# Patient Record
Sex: FEMALE | Race: WHITE | NOT HISPANIC OR LATINO | Employment: FULL TIME | URBAN - METROPOLITAN AREA
[De-identification: names, ages, dates, MRNs, and addresses within clinical notes are randomized per-mention and may not be internally consistent; named-entity substitution may affect disease eponyms.]

---

## 2018-10-16 ENCOUNTER — APPOINTMENT (OUTPATIENT)
Dept: ADMISSIONS | Facility: MEDICAL CENTER | Age: 33
End: 2018-10-16
Attending: SPECIALIST
Payer: COMMERCIAL

## 2018-10-18 ENCOUNTER — HOSPITAL ENCOUNTER (OUTPATIENT)
Facility: MEDICAL CENTER | Age: 33
End: 2018-10-18
Attending: SPECIALIST | Admitting: SPECIALIST
Payer: COMMERCIAL

## 2018-10-18 VITALS
HEART RATE: 76 BPM | BODY MASS INDEX: 34.27 KG/M2 | DIASTOLIC BLOOD PRESSURE: 73 MMHG | WEIGHT: 205.69 LBS | RESPIRATION RATE: 16 BRPM | TEMPERATURE: 97.6 F | OXYGEN SATURATION: 92 % | SYSTOLIC BLOOD PRESSURE: 114 MMHG | HEIGHT: 65 IN

## 2018-10-18 DIAGNOSIS — H90.A31 MIXED CONDUCTIVE AND SENSORINEURAL HEARING LOSS OF RIGHT EAR WITH RESTRICTED HEARING OF LEFT EAR: ICD-10-CM

## 2018-10-18 DIAGNOSIS — H81.311 AUDITORY VERTIGO INVOLVING RIGHT EAR: ICD-10-CM

## 2018-10-18 DIAGNOSIS — H80.11: ICD-10-CM

## 2018-10-18 PROBLEM — H90.71 MIXED HEARING LOSS OF RIGHT EAR: Status: ACTIVE | Noted: 2018-10-18

## 2018-10-18 LAB
B-HCG FREE SERPL-ACNC: <5 MIU/ML
IHCGL IHCGL: NEGATIVE MIU/ML
PATHOLOGY CONSULT NOTE: NORMAL

## 2018-10-18 PROCEDURE — 502573 HCHG PACK, ENT: Performed by: SPECIALIST

## 2018-10-18 PROCEDURE — 88311 DECALCIFY TISSUE: CPT

## 2018-10-18 PROCEDURE — 700111 HCHG RX REV CODE 636 W/ 250 OVERRIDE (IP)

## 2018-10-18 PROCEDURE — 160009 HCHG ANES TIME/MIN: Performed by: SPECIALIST

## 2018-10-18 PROCEDURE — 501838 HCHG SUTURE GENERAL: Performed by: SPECIALIST

## 2018-10-18 PROCEDURE — 700101 HCHG RX REV CODE 250

## 2018-10-18 PROCEDURE — 160002 HCHG RECOVERY MINUTES (STAT): Performed by: SPECIALIST

## 2018-10-18 PROCEDURE — 700111 HCHG RX REV CODE 636 W/ 250 OVERRIDE (IP): Performed by: ANESTHESIOLOGY

## 2018-10-18 PROCEDURE — 88304 TISSUE EXAM BY PATHOLOGIST: CPT

## 2018-10-18 PROCEDURE — 502000 HCHG MISC OR IMPLANTS RC 0278: Performed by: SPECIALIST

## 2018-10-18 PROCEDURE — 160025 RECOVERY II MINUTES (STATS): Performed by: SPECIALIST

## 2018-10-18 PROCEDURE — 160035 HCHG PACU - 1ST 60 MINS PHASE I: Performed by: SPECIALIST

## 2018-10-18 PROCEDURE — 160048 HCHG OR STATISTICAL LEVEL 1-5: Performed by: SPECIALIST

## 2018-10-18 PROCEDURE — 160029 HCHG SURGERY MINUTES - 1ST 30 MINS LEVEL 4: Performed by: SPECIALIST

## 2018-10-18 PROCEDURE — 700102 HCHG RX REV CODE 250 W/ 637 OVERRIDE(OP)

## 2018-10-18 PROCEDURE — 502240 HCHG MISC OR SUPPLY RC 0272: Performed by: SPECIALIST

## 2018-10-18 PROCEDURE — 160047 HCHG PACU  - EA ADDL 30 MINS PHASE II: Performed by: SPECIALIST

## 2018-10-18 PROCEDURE — 160041 HCHG SURGERY MINUTES - EA ADDL 1 MIN LEVEL 4: Performed by: SPECIALIST

## 2018-10-18 PROCEDURE — 84702 CHORIONIC GONADOTROPIN TEST: CPT

## 2018-10-18 PROCEDURE — 160046 HCHG PACU - 1ST 60 MINS PHASE II: Performed by: SPECIALIST

## 2018-10-18 PROCEDURE — 500380 HCHG DRAIN, PENROSE 1/4X12: Performed by: SPECIALIST

## 2018-10-18 PROCEDURE — A9270 NON-COVERED ITEM OR SERVICE: HCPCS

## 2018-10-18 DEVICE — IMPLANTABLE DEVICE: Type: IMPLANTABLE DEVICE | Site: EAR | Status: FUNCTIONAL

## 2018-10-18 RX ORDER — MIDAZOLAM HYDROCHLORIDE 1 MG/ML
1 INJECTION INTRAMUSCULAR; INTRAVENOUS
Status: DISCONTINUED | OUTPATIENT
Start: 2018-10-18 | End: 2018-10-18 | Stop reason: HOSPADM

## 2018-10-18 RX ORDER — LIDOCAINE HYDROCHLORIDE 10 MG/ML
INJECTION, SOLUTION EPIDURAL; INFILTRATION; INTRACAUDAL; PERINEURAL
Status: DISCONTINUED | OUTPATIENT
Start: 2018-10-18 | End: 2018-10-18 | Stop reason: HOSPADM

## 2018-10-18 RX ORDER — ACETAMINOPHEN 325 MG/1
TABLET ORAL
Status: COMPLETED
Start: 2018-10-18 | End: 2018-10-18

## 2018-10-18 RX ORDER — SODIUM CHLORIDE, SODIUM LACTATE, POTASSIUM CHLORIDE, CALCIUM CHLORIDE 600; 310; 30; 20 MG/100ML; MG/100ML; MG/100ML; MG/100ML
INJECTION, SOLUTION INTRAVENOUS ONCE
Status: COMPLETED | OUTPATIENT
Start: 2018-10-18 | End: 2018-10-18

## 2018-10-18 RX ORDER — OXYCODONE HYDROCHLORIDE AND ACETAMINOPHEN 5; 325 MG/1; MG/1
1 TABLET ORAL
Status: DISCONTINUED | OUTPATIENT
Start: 2018-10-18 | End: 2018-10-18 | Stop reason: HOSPADM

## 2018-10-18 RX ORDER — DIPHENHYDRAMINE HYDROCHLORIDE 50 MG/ML
12.5 INJECTION INTRAMUSCULAR; INTRAVENOUS
Status: DISCONTINUED | OUTPATIENT
Start: 2018-10-18 | End: 2018-10-18 | Stop reason: HOSPADM

## 2018-10-18 RX ORDER — HALOPERIDOL 5 MG/ML
1 INJECTION INTRAMUSCULAR
Status: DISCONTINUED | OUTPATIENT
Start: 2018-10-18 | End: 2018-10-18 | Stop reason: HOSPADM

## 2018-10-18 RX ORDER — DIAZEPAM 5 MG/1
TABLET ORAL
Qty: 12 TAB | Refills: 0 | Status: SHIPPED | OUTPATIENT
Start: 2018-10-18 | End: 2018-10-20

## 2018-10-18 RX ORDER — MEPERIDINE HYDROCHLORIDE 25 MG/ML
6.25 INJECTION INTRAMUSCULAR; INTRAVENOUS; SUBCUTANEOUS
Status: DISCONTINUED | OUTPATIENT
Start: 2018-10-18 | End: 2018-10-18 | Stop reason: HOSPADM

## 2018-10-18 RX ORDER — MORPHINE SULFATE 10 MG/ML
5 INJECTION, SOLUTION INTRAMUSCULAR; INTRAVENOUS
Status: DISCONTINUED | OUTPATIENT
Start: 2018-10-18 | End: 2018-10-18 | Stop reason: HOSPADM

## 2018-10-18 RX ORDER — SODIUM CHLORIDE, SODIUM LACTATE, POTASSIUM CHLORIDE, CALCIUM CHLORIDE 600; 310; 30; 20 MG/100ML; MG/100ML; MG/100ML; MG/100ML
INJECTION, SOLUTION INTRAVENOUS CONTINUOUS
Status: DISCONTINUED | OUTPATIENT
Start: 2018-10-18 | End: 2018-10-18 | Stop reason: HOSPADM

## 2018-10-18 RX ORDER — ACETAMINOPHEN 325 MG/1
650 TABLET ORAL EVERY 4 HOURS PRN
Status: DISCONTINUED | OUTPATIENT
Start: 2018-10-18 | End: 2018-10-18 | Stop reason: HOSPADM

## 2018-10-18 RX ORDER — ONDANSETRON 2 MG/ML
4 INJECTION INTRAMUSCULAR; INTRAVENOUS
Status: DISCONTINUED | OUTPATIENT
Start: 2018-10-18 | End: 2018-10-18 | Stop reason: HOSPADM

## 2018-10-18 RX ORDER — OXYCODONE HYDROCHLORIDE AND ACETAMINOPHEN 5; 325 MG/1; MG/1
2 TABLET ORAL
Status: DISCONTINUED | OUTPATIENT
Start: 2018-10-18 | End: 2018-10-18 | Stop reason: HOSPADM

## 2018-10-18 RX ORDER — MORPHINE SULFATE 4 MG/ML
1 INJECTION, SOLUTION INTRAMUSCULAR; INTRAVENOUS
Status: DISCONTINUED | OUTPATIENT
Start: 2018-10-18 | End: 2018-10-18 | Stop reason: HOSPADM

## 2018-10-18 RX ORDER — SCOLOPAMINE TRANSDERMAL SYSTEM 1 MG/1
PATCH, EXTENDED RELEASE TRANSDERMAL
Status: DISCONTINUED
Start: 2018-10-18 | End: 2018-10-18 | Stop reason: HOSPADM

## 2018-10-18 RX ORDER — MORPHINE SULFATE 4 MG/ML
2 INJECTION, SOLUTION INTRAMUSCULAR; INTRAVENOUS
Status: DISCONTINUED | OUTPATIENT
Start: 2018-10-18 | End: 2018-10-18 | Stop reason: HOSPADM

## 2018-10-18 RX ORDER — LABETALOL HYDROCHLORIDE 5 MG/ML
5 INJECTION, SOLUTION INTRAVENOUS
Status: DISCONTINUED | OUTPATIENT
Start: 2018-10-18 | End: 2018-10-18 | Stop reason: HOSPADM

## 2018-10-18 RX ORDER — BACITRACIN ZINC 500 [USP'U]/G
OINTMENT TOPICAL
Status: DISCONTINUED | OUTPATIENT
Start: 2018-10-18 | End: 2018-10-18 | Stop reason: HOSPADM

## 2018-10-18 RX ORDER — EPINEPHRINE 1 MG/ML(1)
AMPUL (ML) INJECTION
Status: DISCONTINUED | OUTPATIENT
Start: 2018-10-18 | End: 2018-10-18 | Stop reason: HOSPADM

## 2018-10-18 RX ADMIN — ACETAMINOPHEN 650 MG: 325 TABLET, FILM COATED ORAL at 13:23

## 2018-10-18 RX ADMIN — ACETAMINOPHEN 650 MG: 325 TABLET ORAL at 13:23

## 2018-10-18 RX ADMIN — FENTANYL CITRATE 50 MCG: 50 INJECTION, SOLUTION INTRAMUSCULAR; INTRAVENOUS at 13:27

## 2018-10-18 RX ADMIN — SODIUM CHLORIDE, SODIUM LACTATE, POTASSIUM CHLORIDE, CALCIUM CHLORIDE: 600; 310; 30; 20 INJECTION, SOLUTION INTRAVENOUS at 07:31

## 2018-10-18 RX ADMIN — FENTANYL CITRATE 50 MCG: 50 INJECTION, SOLUTION INTRAMUSCULAR; INTRAVENOUS at 13:07

## 2018-10-18 ASSESSMENT — PAIN SCALES - GENERAL
PAINLEVEL_OUTOF10: 0
PAINLEVEL_OUTOF10: 5
PAINLEVEL_OUTOF10: 0
PAINLEVEL_OUTOF10: 0
PAINLEVEL_OUTOF10: 5
PAINLEVEL_OUTOF10: 0
PAINLEVEL_OUTOF10: 7
PAINLEVEL_OUTOF10: 7
PAINLEVEL_OUTOF10: 0
PAINLEVEL_OUTOF10: 5

## 2018-10-18 NOTE — DISCHARGE INSTRUCTIONS
ACTIVITY: Rest and take it easy for the first 24 hours.  A responsible adult is recommended to remain with you during that time.  It is normal to feel sleepy.  We encourage you to not do anything that requires balance, judgment or coordination.    MILD FLU-LIKE SYMPTOMS ARE NORMAL. YOU MAY EXPERIENCE GENERALIZED MUSCLE ACHES, THROAT IRRITATION, HEADACHE AND/OR SOME NAUSEA.    FOR 24 HOURS DO NOT:  Drive, operate machinery or run household appliances.  Drink beer or alcoholic beverages.   Make important decisions or sign legal documents.    SPECIAL INSTRUCTIONS: *R ear up and elevate head of bed 30 degrees**    DIET: To avoid nausea, slowly advance diet as tolerated, avoiding spicy or greasy foods for the first day.  Add more substantial food to your diet according to your physician's instructions.  Babies can be fed formula or breast milk as soon as they are hungry.  INCREASE FLUIDS AND FIBER TO AVOID CONSTIPATION.    SURGICAL DRESSING/BATHING: *Keep ear clean and dry**    FOLLOW-UP APPOINTMENT:  A follow-up appointment should be arranged with your doctor; call to schedule.    You should CALL YOUR PHYSICIAN if you develop:  Fever greater than 101 degrees F.  Pain not relieved by medication, or persistent nausea or vomiting.  Excessive bleeding (blood soaking through dressing) or unexpected drainage from the wound.  Extreme redness or swelling around the incision site, drainage of pus or foul smelling drainage.  Inability to urinate or empty your bladder within 8 hours.  Problems with breathing or chest pain.    You should call 911 if you develop problems with breathing or chest pain.  If you are unable to contact your doctor or surgical center, you should go to the nearest emergency room or urgent care center.    Physician's telephone #: *Dr. Sheldon [phone]**    If any questions arise, call your doctor.  If your doctor is not available, please feel free to call the Surgical Center at [phone].  The Center  is open Monday through Friday from 7AM to 7PM.  You can also call the HEALTH HOTLINE open 24 hours/day, 7 days/week and speak to a nurse at [phone], or toll free at [phone].    A registered nurse may call you a few days after your surgery to see how you are doing after your procedure.    MEDICATIONS: Resume taking daily medication.  Take prescribed pain medication with food.  If no medication is prescribed, you may take non-aspirin pain medication if needed.  PAIN MEDICATION CAN BE VERY CONSTIPATING.  Take a stool softener or laxative such as senokot, pericolace, or milk of magnesia if needed.    Prescription given for *none**.  Last pain medication given at *Tylenol at 1:05pm **.    If your physician has prescribed pain medication that includes Acetaminophen (Tylenol), do not take additional Acetaminophen (Tylenol) while taking the prescribed medication.    Depression / Suicide Risk    As you are discharged from this [facility] facility, it is important to learn how to keep safe from harming yourself.    Recognize the warning signs:  · Abrupt changes in personality, positive or negative- including increase in energy   · Giving away possessions  · Change in eating patterns- significant weight changes-  positive or negative  · Change in sleeping patterns- unable to sleep or sleeping all the time   · Unwillingness or inability to communicate  · Depression  · Unusual sadness, discouragement and loneliness  · Talk of wanting to die  · Neglect of personal appearance   · Rebelliousness- reckless behavior  · Withdrawal from people/activities they love  · Confusion- inability to concentrate     If you or a loved one observes any of these behaviors or has concerns about self-harm, here's what you can do:  · Talk about it- your feelings and reasons for harming yourself  · Remove any means that you might use to hurt yourself (examples: pills, rope, extension cords, firearm)  · Get professional help from the  community (Mental Health, Substance Abuse, psychological counseling)  · Do not be alone:Call your Safe Contact- someone whom you trust who will be there for you.  · Call your local CRISIS HOTLINE 184-1248 or 563-008-7067  · Call your local Children's Mobile Crisis Response Team [place] (453) 202-9972 or www.Method  · Call the toll free National Suicide Prevention Hotlines   · National Suicide Prevention Lifeline 813-472-EQHZ (0550)  · National Hope Line Network 800-SUICIDE (195-3636)

## 2018-10-18 NOTE — OR SURGEON
Immediate Post OP Note    PreOp Diagnosis: mixed hearing loss right ear  PostOp Diagnosis: same, obliterative otosclerosis    Procedure(s):  EAR MIDDLE EXPLORATION - Wound Class: Clean  STAPEDOTOMY - with  VEIN GRAFT - Wound Class: Clean    Surgeon(s):  Lucas Sheldon M.D.    Anesthesiologist/Type of Anesthesia:  Anesthesiologist: Gisela Davis M.D./General    Surgical Staff:  Circulator: Bozena Grissom R.N.  Scrub Person: Aneta Romano; Will Saha    Specimens removed if any:  stapes suprastructure    Estimated Blood Loss: negl    Findings: obliterative oval window - drill out;  SMART stapes piston 0.6 x 4.00 mm    Complications: none        10/18/2018 12:11 PM Lucas Sheldon M.D.

## 2018-10-18 NOTE — OP REPORT
DATE OF SERVICE:  10/18/2018    SURGEON:  Lucas Sheldon MD    ASSISTANT:  None.    ANESTHESIA:  General given via endotracheal tube.    ANESTHESIOLOGIST:  Gisela Davis MD    PREOPERATIVE DIAGNOSIS:  Mixed hearing loss, right ear.    POSTOPERATIVE DIAGNOSIS:  Mixed hearing loss, right ear, otosclerosis   obliterative.    NAME OF THE PROCEDURE:  Right middle ear exploration followed by right   stapedotomy with laser, vein graft to ear.    INDICATIONS:  The patient is a 33-year-old woman with a history of progressive   mixed hearing loss worse with each pregnancy compatible with otosclerosis.    This is a bilateral problem, right side greater than left.  She presents now   for surgical intervention on an elective basis.    DESCRIPTION OF PROCEDURE:  The patient was brought in the operating room,   placed in supine position on the operating room table.  General anesthesia is   induced and the patient is endotracheally intubated without difficulty.  Eyes   are protected with taping and the tables turned to 180 degrees.    Head was rotated towards the left side and attention directed to the right   ear, which had been marked preoperatively.  The pinna was isolated from scalp   hair with Steri-Drapes.  A vein along the dorsal forearm on the right hand   side was identified and marked for graft harvesting.  Approximately   three-tenths of 1 mL of 1% Xylocaine with 1:50,000 units of epinephrine was   used to infiltrate along either side of the dorsal forearm skin and the area   of the vein graft harvesting.  An additional 0.75 mL of 1% Xylocaine with   1:50,000 units of epinephrine solution was used to infiltrate along the   external canal in 4 quadrants in a perimeatal fashion.  An additional 0.5 mL   of local anesthetic solution was then used to infiltrate along the lobular   portion of the external ear.  The patient's right ear was then prepped and   draped in the usual fashion along with the right forearm dorsal  aspect being   prepped and draped for graft harvesting.    At this point, attention was directed first to the ear.  Operating microscope   was used to visualize the ear and cerumen and squamous debris were cleared   from the external canal.  The canal was irrigated with Betadine solution.  A   relatively normal appearing tympanic membrane is noted.  An additional 0.25 mL   of local anesthetic solution was used to infiltrate along the posterior canal   wall once again.  Straight Tangirnaq blade is then used to make a posterior   canal wall incision at approximately 11 o'clock position and an additional   posterior canal wall cut is made at approximately 7 o'clock positions.    Transverse canal wall cut was then made approximately 3-4 mm lateral to the   annulus and a tympanomeatal flap is developed.  This was taken down to the   level of the annular sulcus and the annulus was elevated out of its sulcus in   the inferior aspect of the posterior canal to enter the middle ear.  Annulus   elevator is further used to elevate the annulus out of its sulcus and allow   for reflection of the tympanic membrane anteriorly.  Chorda tympani nerve was   identified along with the incudostapedial joint area.  Middle ear appears to   be relatively free of disease, although there is fairly prominent vasculature   present along the promontory and oval window areas.    At this point, the 0-degree otologic endoscope was used for visualization.    The Ortez needle was utilized to palpate the ossicular chain.  There is   actually relative stiffness along the malleus and the incus.  The stapes   superstructure is relatively mobile, although mobility along the footplate is   not identified.  Given the relative stiffness of the malleus and incus, it is   elected to curet away a portion of the scutum bone posteriorly to allow for   better visualization of the incudostapedial joint area.  No bony bars are   appreciated between the head of the  malleus and the anterior scutum wall or   along the incus to the scutum or middle ear area.  A right angle pick is used   to carefully loosen the incudostapedial joint and once loosened, there is   noted to be improved mobility of the incus as well as slightly improved   mobility of the malleus.  The patient was suspected to potentially have some   tympanosclerosis along the incudomalleolar joint area, although gross extra   bony involvement and/or scar tissue formation is not apparent.    Attention was then redirected back to the stapes superstructure.  Again,   palpation of the crura seems to show movement of the capitulum relatively   easily, but again the footplate appears to be relatively immobile.  Attempt is   made then to identify a round window reflex.  Filmy band over the round   window area is lysed to allow for visualization.  A small amount of saline is   placed.  Again, palpation of the stapes superstructure as well as the incus   does not allow for a round window reflex.  With careful inspection with a   30-degree otologic endoscope was then undertaken to show what appears to be   obliteration along the footplate area with increased vascularity present.    Relatively thin stapes crura are present potentially accounting for the   perceived mobility along the stapes superstructure.    At this point, it is elected to divide the incudostapedial joint.  Once   divided, additional manipulation of the stapes superstructure is undertaken to   identify that the oval window area is obliterated.  There does not appear to   be significant movement along the area of the oval window with palpation and   manipulation of the stapes superstructure using both the binocular   otomicroscope at high power as well as the 30-degree otologic endoscope for   visualization.    At this point, a cotton ball with epi was placed in the middle ear and canal   area and attention was redirected to the right dorsal forearm.  An  approximate   1 cm incision is made longitudinally along the skin and subcutaneous tissue   overlying the previously marked vein.  Incision was carried down through the   skin and subcutaneous tissue.  Blunt dissection was undertaken along the   subcutaneous tissue until a segment of vein is identified.  The vein is   dissected free of the fatty tissue and an approximate 2-3 mm segment of the   vein was harvested for graft use between clamps.  Silk ties were used to   ligate both ends of the remaining vein.  The skin incision was closed using   interrupted stitches of 5-0 nylon sutures.  The harvested vein is bivalve and   placed on a fascial press and then pressed for later graft use.    Attention was redirected to the middle ear and cotton ball was removed.  The   operating microscope was again utilized and a KTP laser is used with a 125   micron fiber handpiece.  The prominent vasculature around the oval window area   and promontory is coagulated with the laser in a deep focus fashion at a   power setting of 1 stewart continuous.  Again, the 0 and 30-degree scopes are   both used to further visualize the oval window area just to make sure that   fixation is apparent.  Again, no round window reflex was visualized and there   appears to be obliteration of the bone along the oval window.    At this point, the operating microscope was again utilized as the KTP laser is   used to vaporize the stapedial tendon at a power setting at 1.5 stewart.  The   KTP laser was then utilized at a power setting of 1.5 stewart in a single pulse   mode of 0.1 second duration to divide both the posterior david and the anterior   david using the 30-degree otologic endoscope for visualization for the   anterior david.  The stapes superstructure was then carefully manipulated onto   the promontory and removed as a specimen.  Following removal, it is again   apparent that there is significant thickening of the oval window of stapes   footplate  area.    The KTP laser was utilized with the binocular otomicroscope to create a   ingris along the oval window and stapes footplate area.  Multiple firings   were undertaken as the bone is rather dense and thick, and after initial   creation of a ingris approximately 0.6-0.7 mm in diameter, Minesh drill was   utilized with a 0.6 mm alex to remove a portion of the charred bone along   footplate.  Again, the bone is rather thick and biscuit like and additional   lasering is undertaken followed by further drilling to create a fenestration.    Once a fenestration of approximately 0.6-0.7 mm was created, the previously   harvested vein graft is divided and a portion is used to cover the area of the   oval window and fenestration.    Once the graft is in position, a SMART stapes prosthesis was chosen for use.    Note that prior to removal of the stapes superstructure, measuring from the   footplate to the top of the incus measures approximately 4 mm.  A 4x0.6 mm   SMART piston is chosen for use and is placed in the middle ear.  This was   manipulated on to the vein graft over the area of the fenestration and then   placed around the long process of the incus.  Once positioned, palpation of   the prosthesis reveals motion in a vertical direction indicating placement   into the fenestration.  The KTP laser was then utilized at a power setting of   1 watt to crimp the nitinol portion of the stapes prosthesis.  Once crimped,   palpation of both the malleus and incus reveals movement along the piston   area.    At this point, Gelfoam was placed in the middle ear along the area of the   prosthesis.  Once packing has been accomplished in this fashion, the   tympanomeatal flap was repositioned anatomically.  Bacitracin ointment is used   to fill the external canal and cotton ball and Band-Aid dressings are   applied.  Band-Aid was also applied to the vein graft donor site.    The procedure was then terminated.  The patient  was subsequently awakened from   anesthesia and extubated without difficulty.  She was taken from the   operating room to the recovery area, awake, breathing on her own in stable   condition.  There are no apparent complications.  Blood loss during the   procedure is negligible.       ____________________________________     MD FLORENCE SAMUELS / BRODERICK    DD:  10/18/2018 12:31:24  DT:  10/18/2018 13:23:27    D#:  [id number]  Job#:  [id number]

## 2018-10-18 NOTE — OR NURSING
"1207 Pt transferred to PACU. Report received from OR RN and anesthesia. Appears to have no distress at this time. VS stable, respirations even and unlabored. R ear with cotton ball, CDI. Band-aid on R forearm graft site.    1225 Pt up to bathroom to void with standby assist. No complaints of pain or nausea.    1230 Pt back to bed. Pt complains of \"throbbing\". Pt repositioned to left side. Ice pack applied back behind ear.      1305 Pt with more pain - states 7/10. Dr. Sheldon at bedside. Pt states she does not want narcotic oral pain medications or morphine. Medicated per MAR. Pt tolerating sips of water. No complaints of nausea.    1500 Pt states \"it's just loud in here. I want to go home.\" Disconnected from monitors. Pt with dizziness when standing - \"states I'm ok\".     1520 Pt and family educated on discharge instructions. Verbalized understanding. All questions answered. All belongings are with patient. Pt discharged home.    "

## (undated) DEVICE — ADHESIVE MASTISOL - (48/BX)

## (undated) DEVICE — DRAPE MICROSCOPE 46 X 80 - 10/CA

## (undated) DEVICE — WATER IRRIGATION STERILE 1000ML (12EA/CA)

## (undated) DEVICE — TOWEL, DRAPE, POLYLINED

## (undated) DEVICE — SENSOR SPO2 NEO LNCS ADHESIVE (20/BX) SEE USER NOTES

## (undated) DEVICE — SYRINGE SAFETY 3 ML 18 GA X 1 1/2 BLUNT LL (100/BX 8BX/CA)

## (undated) DEVICE — GOWN WARMING STANDARD FLEX - (30/CA)

## (undated) DEVICE — ELECTRODE DUAL RETURN W/ CORD - (50/PK)

## (undated) DEVICE — SUTURE GENERAL

## (undated) DEVICE — HEAD HOLDER JUNIOR/ADULT

## (undated) DEVICE — BLADE BEAVER 6900 MINI SHARP ALL AROUND (20/CA)

## (undated) DEVICE — GLOVE SZ 7.5 BIOGEL PI MICRO - PF LF (50PR/BX)

## (undated) DEVICE — BALL COTTON STERILE 5/PK - (5/PK 25PK/CA)

## (undated) DEVICE — CANISTER SUCTION RIGID RED 1500CC (40EA/CA)

## (undated) DEVICE — DRAIN PENROSE STERILE 1/4 X - 18 IN  (25EA/BX)

## (undated) DEVICE — DRAPE LG. APERTURE 33 X 51" - (10EA/BX)"

## (undated) DEVICE — SURGIFOAM (12X7) - (12EA/CA)

## (undated) DEVICE — SET EXTENSION 31IN APPX 3.2ML WITH CLAMP (50/CA)WAS 4610-03

## (undated) DEVICE — SLEEVE, VASO, THIGH, MED

## (undated) DEVICE — LASER FIBER SINGLE

## (undated) DEVICE — KIT ANESTHESIA W/CIRCUIT & 3/LT BAG W/FILTER (20EA/CA)

## (undated) DEVICE — TOWELS CLOTH SURGICAL - (4/PK 20PK/CA)

## (undated) DEVICE — BAG DECANTER (50EA/CS)

## (undated) DEVICE — PROTECTOR ULNA NERVE - (36PR/CA)

## (undated) DEVICE — PACK ENT OR - (2EA/CA)

## (undated) DEVICE — WIPE INSTRUMENT MICROWIPE (20EA/SP)

## (undated) DEVICE — WATER IRRIG. STER 3000 ML - (4/CA)

## (undated) DEVICE — TUBE CONNECTING SUCTION - CLEAR PLASTIC STERILE 72 IN (50EA/CA)

## (undated) DEVICE — NEEDLE NON SAFETY 27GA X 1-1/4 IN HYPO (100EA/BX)

## (undated) DEVICE — CORDS BIPOLAR COAGULATION - 12FT STERILE DISP. (10EA/BX)

## (undated) DEVICE — SUCTION INSTRUMENT YANKAUER BULBOUS TIP W/O VENT (50EA/CA)

## (undated) DEVICE — TUBE E-T HI-LO CUFF 7.0MM (10EA/PK)

## (undated) DEVICE — MASK ANESTHESIA ADULT  - (100/CA)

## (undated) DEVICE — GLOVE BIOGEL PI INDICATOR SZ 7.5 SURGICAL PF LF -(50/BX 4BX/CA)

## (undated) DEVICE — ELECTRODE 850 FOAM ADHESIVE - HYDROGEL RADIOTRNSPRNT (50/PK)

## (undated) DEVICE — CIRCUIT VENTILATOR PEDIATRIC WITH FILTER  (20EA/CS)

## (undated) DEVICE — GLOVE SZ 6.5 BIOGEL PI MICRO - PF LF (50PR/BX)

## (undated) DEVICE — SYRINGE SAFETY 10 ML 18 GA X 1 1/2 BLUNT LL (100/BX 4BX/CA)

## (undated) DEVICE — CANISTER SUCTION 3000ML MECHANICAL FILTER AUTO SHUTOFF MEDI-VAC NONSTERILE LF DISP  (40EA/CA)

## (undated) DEVICE — KIT  I.V. START (100EA/CA)

## (undated) DEVICE — GOWN SURGEONS LARGE - (32/CA)

## (undated) DEVICE — MEDICINE CUP STERILE 2 OZ - (100/CA)

## (undated) DEVICE — DRAPE SURGICAL U 77X120 - (10/CA)

## (undated) DEVICE — DRAPE STRLE REG TOWEL 18X24 - (10/BX 4BX/CA)"

## (undated) DEVICE — SODIUM CHL IRRIGATION 0.9% 1000ML (12EA/CA)

## (undated) DEVICE — ANTI-FOG SOLUTION - 60BTL/CA

## (undated) DEVICE — SYRINGE SAFETY 5 ML 18 GA X 1-1/2 BLUNT LL (100/BX 4BX/CA)

## (undated) DEVICE — BLADE BEAVER 7200 (ENT) - 60 ANGLE (3EA/SP)

## (undated) DEVICE — SET LEADWIRE 5 LEAD BEDSIDE DISPOSABLE ECG (1SET OF 5/EA)

## (undated) DEVICE — CHLORAPREP 26 ML APPLICATOR - ORANGE TINT(25/CA)

## (undated) DEVICE — TUBING CLEARLINK DUO-VENT - C-FLO (48EA/CA)

## (undated) DEVICE — CATHETER IV 14 GA X 2 ---SURG.& SDS ONLY---(200EA/CA)

## (undated) DEVICE — SYRINGE SAFETY TB 1 ML 27 GA X 1/2 IN (100/BX 4BX/CA)

## (undated) DEVICE — GLOVE BIOGEL SZ 7.5 SURGICAL PF LTX - (50PR/BX 4BX/CA)

## (undated) DEVICE — SHEATH RO 4F 10CM (10EA/BX)

## (undated) DEVICE — CATHETER IV 20 GA X 1-1/4 ---SURG.& SDS ONLY--- (50EA/BX)

## (undated) DEVICE — SUTURE 4-0 VICRYL PLUS PC-3 18 (12PK/BX)"

## (undated) DEVICE — SUTURE 5-0 PROLENE P-3 - (12/BX)

## (undated) DEVICE — BANDAID SHEER STRIP 3/4 IN (100EA/BX 12BX/CA)

## (undated) DEVICE — SET EXTENSION WITH 2 PORTS (48EA/CA) ***PART #2C8610 IS A SUBSTITUTE*****

## (undated) DEVICE — DISH PETRI STERILE (50EA/CA)

## (undated) DEVICE — NEPTUNE 4 PORT MANIFOLD - (20/PK)

## (undated) DEVICE — TRAY SRGPRP PVP IOD WT PRP - (20/CA)

## (undated) DEVICE — LACTATED RINGERS INJ 1000 ML - (14EA/CA 60CA/PF)